# Patient Record
Sex: MALE | Race: ASIAN | NOT HISPANIC OR LATINO | Employment: FULL TIME | URBAN - METROPOLITAN AREA
[De-identification: names, ages, dates, MRNs, and addresses within clinical notes are randomized per-mention and may not be internally consistent; named-entity substitution may affect disease eponyms.]

---

## 2021-05-29 ENCOUNTER — RECORDS - HEALTHEAST (OUTPATIENT)
Dept: ADMINISTRATIVE | Facility: CLINIC | Age: 46
End: 2021-05-29

## 2023-05-10 ENCOUNTER — HOSPITAL ENCOUNTER (EMERGENCY)
Facility: HOSPITAL | Age: 48
Discharge: HOME | End: 2023-05-10
Admitting: PHYSICIAN ASSISTANT
Payer: COMMERCIAL

## 2023-05-10 VITALS
BODY MASS INDEX: 27.04 KG/M2 | RESPIRATION RATE: 21 BRPM | OXYGEN SATURATION: 99 % | WEIGHT: 165 LBS | DIASTOLIC BLOOD PRESSURE: 92 MMHG | HEART RATE: 62 BPM | TEMPERATURE: 97.5 F | SYSTOLIC BLOOD PRESSURE: 137 MMHG

## 2023-05-10 DIAGNOSIS — E86.0 DEHYDRATION: ICD-10-CM

## 2023-05-10 DIAGNOSIS — R55 NEAR SYNCOPE: ICD-10-CM

## 2023-05-10 LAB
ANION GAP SERPL CALCULATED.3IONS-SCNC: 11 MMOL/L (ref 7–15)
BASOPHILS # BLD AUTO: 0.1 10E3/UL (ref 0–0.2)
BASOPHILS NFR BLD AUTO: 1 %
BUN SERPL-MCNC: 15.8 MG/DL (ref 6–20)
CALCIUM SERPL-MCNC: 9.7 MG/DL (ref 8.6–10)
CHLORIDE SERPL-SCNC: 103 MMOL/L (ref 98–107)
CREAT SERPL-MCNC: 0.97 MG/DL (ref 0.67–1.17)
DEPRECATED HCO3 PLAS-SCNC: 27 MMOL/L (ref 22–29)
EOSINOPHIL # BLD AUTO: 0.2 10E3/UL (ref 0–0.7)
EOSINOPHIL NFR BLD AUTO: 2 %
ERYTHROCYTE [DISTWIDTH] IN BLOOD BY AUTOMATED COUNT: 12.8 % (ref 10–15)
GFR SERPL CREATININE-BSD FRML MDRD: >90 ML/MIN/1.73M2
GLUCOSE SERPL-MCNC: 107 MG/DL (ref 70–99)
HCT VFR BLD AUTO: 48.1 % (ref 40–53)
HGB BLD-MCNC: 16 G/DL (ref 13.3–17.7)
IMM GRANULOCYTES # BLD: 0 10E3/UL
IMM GRANULOCYTES NFR BLD: 0 %
LYMPHOCYTES # BLD AUTO: 1.2 10E3/UL (ref 0.8–5.3)
LYMPHOCYTES NFR BLD AUTO: 16 %
MAGNESIUM SERPL-MCNC: 2.2 MG/DL (ref 1.7–2.3)
MCH RBC QN AUTO: 30.8 PG (ref 26.5–33)
MCHC RBC AUTO-ENTMCNC: 33.3 G/DL (ref 31.5–36.5)
MCV RBC AUTO: 93 FL (ref 78–100)
MONOCYTES # BLD AUTO: 0.7 10E3/UL (ref 0–1.3)
MONOCYTES NFR BLD AUTO: 9 %
NEUTROPHILS # BLD AUTO: 5.6 10E3/UL (ref 1.6–8.3)
NEUTROPHILS NFR BLD AUTO: 72 %
NRBC # BLD AUTO: 0 10E3/UL
NRBC BLD AUTO-RTO: 0 /100
PLATELET # BLD AUTO: 238 10E3/UL (ref 150–450)
POTASSIUM SERPL-SCNC: 4 MMOL/L (ref 3.4–5.3)
RBC # BLD AUTO: 5.2 10E6/UL (ref 4.4–5.9)
SODIUM SERPL-SCNC: 141 MMOL/L (ref 136–145)
TROPONIN T SERPL HS-MCNC: <6 NG/L
TROPONIN T SERPL HS-MCNC: <6 NG/L
WBC # BLD AUTO: 7.7 10E3/UL (ref 4–11)

## 2023-05-10 PROCEDURE — 85025 COMPLETE CBC W/AUTO DIFF WBC: CPT | Performed by: PHYSICIAN ASSISTANT

## 2023-05-10 PROCEDURE — 83735 ASSAY OF MAGNESIUM: CPT | Performed by: PHYSICIAN ASSISTANT

## 2023-05-10 PROCEDURE — 96361 HYDRATE IV INFUSION ADD-ON: CPT

## 2023-05-10 PROCEDURE — 80048 BASIC METABOLIC PNL TOTAL CA: CPT | Performed by: PHYSICIAN ASSISTANT

## 2023-05-10 PROCEDURE — 96360 HYDRATION IV INFUSION INIT: CPT

## 2023-05-10 PROCEDURE — 36415 COLL VENOUS BLD VENIPUNCTURE: CPT | Performed by: PHYSICIAN ASSISTANT

## 2023-05-10 PROCEDURE — 93005 ELECTROCARDIOGRAM TRACING: CPT | Performed by: PHYSICIAN ASSISTANT

## 2023-05-10 PROCEDURE — 258N000003 HC RX IP 258 OP 636: Performed by: PHYSICIAN ASSISTANT

## 2023-05-10 PROCEDURE — 84484 ASSAY OF TROPONIN QUANT: CPT | Performed by: PHYSICIAN ASSISTANT

## 2023-05-10 PROCEDURE — 99284 EMERGENCY DEPT VISIT MOD MDM: CPT | Mod: 25

## 2023-05-10 RX ADMIN — SODIUM CHLORIDE 1000 ML: 9 INJECTION, SOLUTION INTRAVENOUS at 08:57

## 2023-05-10 ASSESSMENT — ACTIVITIES OF DAILY LIVING (ADL)
ADLS_ACUITY_SCORE: 37
ADLS_ACUITY_SCORE: 37

## 2023-05-10 ASSESSMENT — ENCOUNTER SYMPTOMS
ABDOMINAL PAIN: 0
PALPITATIONS: 0
SHORTNESS OF BREATH: 0
NAUSEA: 1
LIGHT-HEADEDNESS: 1

## 2023-05-10 NOTE — ED TRIAGE NOTES
Pt presents to triage ambulatory for syncope. Pt reports he was working around 0700 he felt dizzy, got red, and felt like he was going to pass out. Pt reports symptoms are better but have not gone away completely. Pt reports he has not been drinking a lot of water today.

## 2023-05-10 NOTE — DISCHARGE INSTRUCTIONS
You were seen here in the emergency department for evaluation of near syncope.  Your laboratory studies here are unremarkable.  Your cardiac testing here was normal.  I suspect this is likely secondary to dehydration.  Try to stay hydrated over the next couple of days.  Return to the emergency department any new or worsening symptoms, otherwise please follow-up with your primary care doctor as needed.

## 2023-05-10 NOTE — ED PROVIDER NOTES
EMERGENCY DEPARTMENT ENCOUNTER      NAME: Hany Knox  AGE: 48 year old male  YOB: 1975  MRN: [id number]  EVALUATION DATE & TIME: 5/10/2023  8:38 AM    PCP: No primary care provider on file.    ED PROVIDER: Curt Avila PA-C      Chief Complaint   Patient presents with     Syncope         FINAL IMPRESSION:  1. Near syncope    2. Dehydration          ED COURSE & MEDICAL DECISION MAKING:    Pertinent Labs & Imaging studies reviewed. (See chart for details)  8:40 AM I met the patient and performed my initial interview and exam.   10:00 AM Patient seen and updated on labs, plan for delta troponin given the duration of patients symptoms.   11:19 AM Rechecked and updated the patient. We discussed the plan for discharge and the patient is agreeable. Reviewed supportive cares, symptomatic treatment, outpatient follow up, and reasons to return to the Emergency Department. Patient to be discharged by ED RN.     48 year old male presents to the Emergency Department for evaluation of near syncope.     ED Course as of 05/10/23 1120   Wed May 10, 2023   0845 Patient is a 48-year-old male, presents emergency department for evaluation of near syncopal episode.  Patient around 7 AM this morning got dizzy, fell grabbed, folic he was going to pass out.  Did not actually pass out.  Symptoms got better, however not completely gone away.  Reports that he worked overnight, did not drink significant amount of water.  On examination here, is not tachycardic or tachypneic.  He has no numbness or tingling.  Neurologic exam here is completely unremarkable.  He has no chest pain or shortness of breath.  Does still feel a little bit dizzy.  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  He has no abdominal pain.  No changes in bowel or bladder.  No fevers.  Did not actually lose consciousness.  Denies any significant past medical history.  Differential at  this point includes atypical ACS, electrolyte abnormality, dehydration, likely to be intra-abdominal pathology given his lack of abdominal pain, normal vitals here.  Additionally lung sounds are clear bilaterally, certainly does not seem to be pulmonary in etiology.  Plan for EKG, basic labs, troponin, some fluids, and reevaluation.  Suspect dehydration is likely cause here given the significant mount of activity he was doing without significant fluid intake.  We will obtain basic laboratory studies here to ensure that there are no acute abnormalities, and no evidence of cardiac ischemia.   0932 Patient's laboratories here are unremarkable, troponin undetectable, magnesium normal, electrolytes normal.  EKG here shows sinus bradycardia however no other acute abnormalities.   1020 Patient seen and reevaluated here, updated on imaging.  Initial troponin here was normal.  EKG appears consistent with patient's previous.  Given the duration of patient's symptoms, will perform a delta troponin as he is well within the 6-hour window of symptom development.  Patient feeling somewhat better after some fluids here.  The remainder of his electrolytes, and blood work here is well within normal limits.  He is agreeable to stay for delta troponin.   1118 Delta troponin here unremarkable, still not elevated.  Likely near syncopal event due to dehydration.  Patient feeling significantly improved after fluid bolus here in the emergency department.  No indication for any further labs or work-up.  Recommend a follow-up with primary care doctor.  Discussed return precautions, patient expressed understanding.  Plan for discharge.       Medical Decision Making    History:    Supplemental history from: N/A    External Record(s) reviewed: Documented in chart, if applicable.    Work Up:    Chart documentation includes differential considered and any EKGs or imaging independently interpreted by provider, where specified.    In additional to  work up documented, I considered the following work up: Documented in chart, if applicable.    External consultation:    Discussion of management with another provider: Documented in chart, if applicable    Complicating factors:    Care impacted by chronic illness: N/A    Care affected by social determinants of health: N/A    Disposition considerations: Discharge. No recommendations on prescription strength medication(s). N/A.       At the conclusion of the encounter I discussed the results of all of the tests and the disposition. The questions were answered. The patient or family acknowledged understanding and was agreeable with the care plan.     0 minutes of critical care time     MEDICATIONS GIVEN IN THE EMERGENCY:  Medications   0.9% sodium chloride BOLUS (0 mLs Intravenous Stopped 5/10/23 1049)       NEW PRESCRIPTIONS STARTED AT TODAY'S ER VISIT  New Prescriptions    No medications on file       =================================================================    HPI    Patient information was obtained from: Patient    Use of : N/A      Hany Knox is a 48 year old male with no pertinent history who presents to this ED by walk in for evaluation of lightheadedness.    The patient reports around 0700 this morning he was at work riding a bike around a warehouse when he began to feel lightheaded as if he was going to pass out and nauseated. He stopped the bike and sat down. He then walked outside to get some fresh air. He never lost consciousness. He did not have any associated vision changes, chest pain, palpitations, shortness of breath, or abdominal pain. After about 30 minutes he left work and tried to be seen at urgent care, but they were full prompting his ED presentation. He notes he has not had much water over the past 12 hours and the warehouse was very humid. At time of presentation he is still feeling slightly lightheaded, but his symptoms have otherwise resolved. He is otherwise healthy and  denies any other complaints at this time.    REVIEW OF SYSTEMS   Review of Systems   Eyes: Negative for visual disturbance.   Respiratory: Negative for shortness of breath.    Cardiovascular: Negative for chest pain and palpitations.   Gastrointestinal: Positive for nausea. Negative for abdominal pain.   Neurological: Positive for light-headedness. Negative for syncope.   All other systems reviewed and are negative.      PAST MEDICAL HISTORY:  History reviewed. No pertinent past medical history.    PAST SURGICAL HISTORY:  History reviewed. No pertinent surgical history.    CURRENT MEDICATIONS:    ondansetron (ZOFRAN) 4 MG tablet       ALLERGIES:  Allergies   Allergen Reactions     Grass Pollen [Grass] Other (See Comments)     Runny nose, itching eyes       FAMILY HISTORY:  History reviewed. No pertinent family history.    SOCIAL HISTORY:   Social History     Socioeconomic History     Marital status: Single   Tobacco Use     Smoking status: Never   Substance and Sexual Activity     Alcohol use: Yes     Comment: Alcoholic Drinks/day: Drinks once per week       VITALS:  /88   Pulse 60   Temp 97.5  F (36.4  C) (Oral)   Resp 21   Wt 74.8 kg (165 lb)   SpO2 100%   BMI 27.04 kg/m      PHYSICAL EXAM    Physical Exam  Vitals and nursing note reviewed.   Constitutional:       General: He is not in acute distress.     Appearance: Normal appearance. He is normal weight. He is not toxic-appearing or diaphoretic.   HENT:      Head: Normocephalic.      Right Ear: External ear normal.      Left Ear: External ear normal.   Eyes:      General: No visual field deficit.  Cardiovascular:      Rate and Rhythm: Normal rate and regular rhythm.      Heart sounds: Normal heart sounds. No murmur heard.     No friction rub. No gallop.   Pulmonary:      Effort: Pulmonary effort is normal. No respiratory distress.      Breath sounds: No wheezing.   Abdominal:      General: Abdomen is flat. Bowel sounds are normal. There is no  distension.      Palpations: Abdomen is soft.      Tenderness: There is no abdominal tenderness. There is no right CVA tenderness, left CVA tenderness, guarding or rebound.   Musculoskeletal:         General: No tenderness. Normal range of motion.      Right lower leg: No edema.      Left lower leg: No edema.   Skin:     General: Skin is warm.      Findings: No erythema or rash.   Neurological:      General: No focal deficit present.      Mental Status: He is alert. Mental status is at baseline.      Cranial Nerves: No facial asymmetry.      Sensory: No sensory deficit.      Motor: No weakness or pronator drift.           LAB:  All pertinent labs reviewed and interpreted.  Labs Ordered and Resulted from Time of ED Arrival to Time of ED Departure   BASIC METABOLIC PANEL - Abnormal       Result Value    Sodium 141      Potassium 4.0      Chloride 103      Carbon Dioxide (CO2) 27      Anion Gap 11      Urea Nitrogen 15.8      Creatinine 0.97      Calcium 9.7      Glucose 107 (*)     GFR Estimate >90     TROPONIN T, HIGH SENSITIVITY - Normal    Troponin T, High Sensitivity <6     MAGNESIUM - Normal    Magnesium 2.2     TROPONIN T, HIGH SENSITIVITY - Normal    Troponin T, High Sensitivity <6     CBC WITH PLATELETS AND DIFFERENTIAL    WBC Count 7.7      RBC Count 5.20      Hemoglobin 16.0      Hematocrit 48.1      MCV 93      MCH 30.8      MCHC 33.3      RDW 12.8      Platelet Count 238      % Neutrophils 72      % Lymphocytes 16      % Monocytes 9      % Eosinophils 2      % Basophils 1      % Immature Granulocytes 0      NRBCs per 100 WBC 0      Absolute Neutrophils 5.6      Absolute Lymphocytes 1.2      Absolute Monocytes 0.7      Absolute Eosinophils 0.2      Absolute Basophils 0.1      Absolute Immature Granulocytes 0.0      Absolute NRBCs 0.0         RADIOLOGY:  Reviewed all pertinent imaging. Please see official radiology report.  No orders to display       EKG:    Performed at: 0849    Impression: Sinus  Bradycardia    Rate: 57  Rhythm: Sinus Renato  Axis: 39 -38 3   AK Interval: 158  QRS Interval: 116  QTc Interval: 420  ST Changes: Noted depression in V1, III  Comparison: When compared to previous from 2019 no new ST depression, inversion    I have reviewed and interpreted the EKG(s) documented above along with Dr. Rey ED MD.    PROCEDURES:   None.       IJeff, am serving as a scribe to document services personally performed by Curt Avila PA-C, based on my observation and the provider's statements to me. I, Curt Avila PA-C, attest that Jeff Boykin is acting in a scribe capacity, has observed my performance of the services and has documented them in accordance with my direction.    Curt Avila PA-C  Emergency Medicine  [facility] EMERGENCY DEPARTMENT  [facility] [facility] [address]  [phone]  Dept: [phone]     Curt Avila PA-C  05/10/23 5574

## 2023-05-12 LAB
ATRIAL RATE - MUSE: 57 BPM
DIASTOLIC BLOOD PRESSURE - MUSE: NORMAL MMHG
INTERPRETATION ECG - MUSE: NORMAL
P AXIS - MUSE: 39 DEGREES
PR INTERVAL - MUSE: 158 MS
QRS DURATION - MUSE: 116 MS
QT - MUSE: 432 MS
QTC - MUSE: 420 MS
R AXIS - MUSE: -38 DEGREES
SYSTOLIC BLOOD PRESSURE - MUSE: NORMAL MMHG
T AXIS - MUSE: 3 DEGREES
VENTRICULAR RATE- MUSE: 57 BPM